# Patient Record
Sex: FEMALE
[De-identification: names, ages, dates, MRNs, and addresses within clinical notes are randomized per-mention and may not be internally consistent; named-entity substitution may affect disease eponyms.]

---

## 2019-09-27 ENCOUNTER — TRANSCRIPTION ENCOUNTER (OUTPATIENT)
Age: 34
End: 2019-09-27

## 2020-01-18 ENCOUNTER — TRANSCRIPTION ENCOUNTER (OUTPATIENT)
Age: 35
End: 2020-01-18

## 2020-08-23 ENCOUNTER — TRANSCRIPTION ENCOUNTER (OUTPATIENT)
Age: 35
End: 2020-08-23

## 2021-02-09 ENCOUNTER — TRANSCRIPTION ENCOUNTER (OUTPATIENT)
Age: 36
End: 2021-02-09

## 2021-07-23 ENCOUNTER — TRANSCRIPTION ENCOUNTER (OUTPATIENT)
Age: 36
End: 2021-07-23

## 2022-12-16 PROBLEM — Z00.00 ENCOUNTER FOR PREVENTIVE HEALTH EXAMINATION: Status: ACTIVE | Noted: 2022-12-16

## 2022-12-19 ENCOUNTER — APPOINTMENT (OUTPATIENT)
Dept: ORTHOPEDIC SURGERY | Facility: CLINIC | Age: 37
End: 2022-12-19

## 2022-12-19 ENCOUNTER — NON-APPOINTMENT (OUTPATIENT)
Age: 37
End: 2022-12-19

## 2022-12-19 VITALS — WEIGHT: 293 LBS | BODY MASS INDEX: 50.02 KG/M2 | HEIGHT: 64 IN

## 2022-12-19 PROCEDURE — 99204 OFFICE O/P NEW MOD 45 MIN: CPT

## 2022-12-19 RX ORDER — MONTELUKAST SODIUM 10 MG/1
TABLET, FILM COATED ORAL
Refills: 0 | Status: ACTIVE | COMMUNITY

## 2022-12-19 RX ORDER — METFORMIN HYDROCHLORIDE 625 MG/1
TABLET ORAL
Refills: 0 | Status: ACTIVE | COMMUNITY

## 2022-12-19 NOTE — REVIEW OF SYSTEMS
[Joint Pain] : joint pain [Negative] : Heme/Lymph no back pain, no gout, no musculoskeletal pain, no neck pain, and no weakness.

## 2022-12-28 ENCOUNTER — APPOINTMENT (OUTPATIENT)
Dept: ORTHOPEDIC SURGERY | Facility: CLINIC | Age: 37
End: 2022-12-28

## 2022-12-28 DIAGNOSIS — M54.16 RADICULOPATHY, LUMBAR REGION: ICD-10-CM

## 2022-12-28 PROCEDURE — 99214 OFFICE O/P EST MOD 30 MIN: CPT

## 2022-12-28 RX ORDER — PREDNISONE 10 MG/1
10 TABLET ORAL
Qty: 30 | Refills: 0 | Status: ACTIVE | COMMUNITY
Start: 2022-12-19 | End: 1900-01-01

## 2022-12-29 NOTE — PHYSICAL EXAM
[de-identified] : Constitutional: Well-nourished, well-developed, No acute distress, morbidly obese\par Respiratory:  Good respiratory effort, no SOB\par Lymphatic: No regional lymphadenopathy, no lymphedema\par Psychiatric: Pleasant and normal affect, alert and oriented x3\par Musculoskeletal: normal except where as noted in regional exam\par \par \par Lumbar Spine Exam\par APPEARANCE: no marked deformities or malalignment, normal curvature of the lumbosacral spine. good posture.\par POSITIVE TENDERNESS: + Left lower lumbar paraspinal muscles\par NONTENDER: no bony midline tenderness\par ROM: Limited flexion due to stiffness and pain, mildly limited SB/Rot\par RESISTIVE TESTING: + pain 4/5 resisted flex/ext, sidebending b/l, and rotation\par SPECIAL TESTS: + Left SLR and FRANCISCA, neg Trendelenburg b/l \par PULSES: 2+ DP/PT pulses\par NEURO:  + weakness of left dorsiflexion and great toe extension, otherwise L1 - S2 intact to sensation and motor, DTRs 2+/4 patella and achilles\par  [de-identified] : I reviewed, interpreted and clinically correlated the following outside imaging studies,\par  Date of Exam: 12-\par  \par EXAM:  MRI LUMBAR SPINE WITHOUT CONTRAST\par \par HISTORY: Lower back pain.  \par \par TECHNIQUE:  MRI of the lumbar spine was performed utilizing axial T1 and T2, as well as sagittal T1 and T2 weighted pulse sequences, without intravenous contrast material.\par \par COMPARISON:  None.\par \par FINDINGS:\par \par For purposes of this dictation, the last well-formed disc space will be labeled L5-S1.\par \par Osseous structures: Five lumbar bodies are assumed. No acute fracture. No pars defect identified within the limitations of MRI technique. Vertebral body heights are preserved. No areas of bone destruction or abnormal marrow replacement.\par Alignment: Straightening of the normal lumbar lordosis. No listhesis. No scoliosis.\par Spinal cord: Normal.\par Included thoracic spine and sacrum: Unremarkable.\par Paraspinal musculature: Unremarkable.\par Included abdomen and pelvis: Unremarkable.\par Individual motion segments: Disc desiccation from L3 to L5.\par \par The following axial levels are imaged and detailed below:\par \par L1-L2: No disc bulging or herniation. No spinal canal or foraminal stenosis.\par \par L2-L3: No disc bulging or herniation. No spinal canal or foraminal stenosis.\par \par L3-L4: Broad-based disc bulge with a superimposed left paracentral disc protrusion. There is mild bilateral facet arthrosis. This results in mild left neural foraminal narrowing.\par \par L4-L5: Broad-based disc bulge with a superimposed inferiorly migrated left paracentral disc extrusion which narrows the left lateral recess. This combines with mild bilateral facet arthrosis to result in moderate right and mild left neural foraminal narrowing\par \par L5-S1: No disc bulging or herniation. Mild facet arthrosis. No spinal canal or foraminal stenosis.\par \par IMPRESSION: MRI of the lumbar spine demonstrates:\par \par Disc herniations at L3-L4 and L4-L5. There is moderate right and mild left neural foraminal narrowing. At L4-L5, the disc extrusion narrows the left lateral recess (descending L5 nerve root).

## 2022-12-29 NOTE — DISCUSSION/SUMMARY
[de-identified] : Patient was seen today for reevaluation of chronic low back pain with left lower extremity radiculopathy.  There has been no significant interval change in the patient's clinical condition based on reported symptoms or her clinical exam since last evaluation 2 weeks ago.  We reviewed her MRI results which shows that she has significant disc extrusion causing notable compression of the left-sided L5 nerve root.  Patient was advised that this is the likely etiology of her pain.  With the persistence of the patient's pain and dysfunction as well as her significant MRI findings I recommend she have consultation with one of my orthopedic spine associates to discuss risk/benefits of surgical intervention.  While she might be a candidate for epidural steroid injection prior to surgical intervention I do recommend surgical consultation as next step in management as she has a significant disc extrusion which may be too far progressed to undergo injection.  Patient states she has been in significant pain and has had limited response to several oral medications, I am unsure if epidural injection and/or physical therapy will be able to help the patient, I recommend at least a surgical consultation as next step prior to further conservative measures that may be futile.\par Patient is requesting medication for pain today.  I inquired if the patient had any relief from the prednisone prescribed at last visit, she states that the pharmacy never received the prescription.  I inquired if the patient called me to notify me of this since this was 2 weeks ago, she states she called the .  I asked the patient what phone number she called, she did not know what number she called.  I advised the patient we do not have general access to the  at this office location, we only have a phone number that goes directly to a call center that services all of our multiple office locations.  They are always dutiful in taking notes and providing messages to my staff and myself when left for me.  I advised the patient if she called the office and notified us that she did not receive the medication we could have addressed this immediately, there was no need to wait for 2-week follow-up to notify me that she has not received the medication I prescribed at last visit.  I advised the patient that we can represcribe the prednisone as discussed, she states that prednisone does not work for her and that she has been on prednisone for the last 2 months.  I inquired where the patient has received her prednisone prescriptions, she states the ER and urgent care.  I reiterated to the patient the same discussion we had at our last office visit 2 weeks ago that she received a Medrol Dosepak and that is not a sufficient strength or duration to address lumbar radicular pain, the best next step in medical management is still to proceed with an appropriate prednisone taper as discussed at last visit.  I also reminded the patient that a Medrol Dosepak is a 6-day course of prednisone, that does not mean she was on prednisone for the last 2 months.  At this time recommend a course of oral prednisone with an appropriate taper (We discussed all possible side effects of this medication), patient advised to not take oral NSAIDs while on prednisone.  Patient does not think prednisone will work, she is requesting a prescription for oxycodone.  I advised the patient that she is now 8 weeks status post onset of injury, I do not recommend starting narcotics at this time as they have addictive potential and are not safe for her.  I reviewed with the patient again that she had pain for 6 weeks before she presented to the office for evaluation, and that she was seen 2 weeks ago.  If she was in significant pain I would have her expected her to call my office and be in more close contact and not wait for 2-week follow-up to notify me that she is in" severe pain" which would require narcotic pain medication.  Additionally, patient had MRI completed 1 week ago, there was no need for her to wait an entire week to come back in and review the results, she could have called me immediately when the MRI was completed, and/or she could have been seen sooner as I had multiple appointments available every work day for the last 1 week.  While the patient states she is in severe pain, she is seated comfortably in the chair in no acute distress.  With her above listed delays in seeking treatment I have significant questions as to the accuracy of her reported severe pain level.\par Patient will proceed with orthopedic spine consultation as recommended, the surgeon can help her determine what is the best next course in her management in regards to surgical intervention versus further management of her lumbar radicular pain by physiatry.  Patient was accompanied to the office today by a young female adult, and she called her mother who participated in the office visit via [employer].  Patient understands and agrees with current plan.\par \par This note was generated using dragon medical dictation software.  A reasonable effort has been made for proofreading its contents, but typos may still remain.  If there are any questions or points of clarification needed please notify my office.
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
## 2022-12-29 NOTE — RETURN TO WORK/SCHOOL
[FreeTextEntry1] : Elisha was seen today for evaluation of chronic low back pain.  She is unable to perform her work duties at this time.  Please excuse her from work until reassessed in 2 weeks.  This note expires on 1/3/2023.  If no new note is provided please consider cleared for full work duty without restrictions as of 1/4/2023.\par Should you have any questions please call the office at [phone]\par Thank you for your understanding.\par \par Sincerely,\par \par John Peoples DO, ATC\par Primary Care Sports Medicine\par [facility]\par

## 2022-12-29 NOTE — HISTORY OF PRESENT ILLNESS
[de-identified] : Patient is here for LBP follow up. Patient is here to review MRI results. Patient is taking Ibuprofen. She states that she has noticed swelling in her left foot. She has not started PT. She is not working currently.

## 2022-12-29 NOTE — PHYSICAL EXAM
[de-identified] : Constitutional: Well-nourished, well-developed, No acute distress, morbidly obese\par Respiratory:  Good respiratory effort, no SOB\par Lymphatic: No regional lymphadenopathy, no lymphedema\par Psychiatric: Pleasant and normal affect, alert and oriented x3\par Musculoskeletal: normal except where as noted in regional exam\par \par \par Lumbar Spine Exam\par APPEARANCE: no marked deformities or malalignment, normal curvature of the lumbosacral spine. good posture.\par POSITIVE TENDERNESS: + Left lower lumbar paraspinal muscles\par NONTENDER: no bony midline tenderness\par ROM: Limited flexion due to stiffness and pain, mildly limited SB/Rot\par RESISTIVE TESTING: + pain 4/5 resisted flex/ext, sidebending b/l, and rotation\par SPECIAL TESTS: + Left SLR and FRANCISCA, neg Trendelenburg b/l \par PULSES: 2+ DP/PT pulses\par NEURO:  + weakness of left dorsiflexion and great toe extension, otherwise L1 - S2 intact to sensation and motor, DTRs 2+/4 patella and achilles\par  [de-identified] : I reviewed, interpreted and clinically correlated the following outside imaging studies,\par \par [facility] 11/30/22\par X-ray report, multiple views of the lumbar spine\par No evidence of acute compression fracture or spondylolisthesis. \par \par Patient does not bring CD of images to review actual x-rays.

## 2023-01-11 ENCOUNTER — APPOINTMENT (OUTPATIENT)
Dept: ORTHOPEDIC SURGERY | Facility: CLINIC | Age: 38
End: 2023-01-11
Payer: MEDICAID

## 2023-01-11 VITALS — HEIGHT: 64 IN | WEIGHT: 293 LBS | BODY MASS INDEX: 50.02 KG/M2

## 2023-01-11 DIAGNOSIS — M54.16 RADICULOPATHY, LUMBAR REGION: ICD-10-CM

## 2023-01-11 DIAGNOSIS — M51.26 OTHER INTERVERTEBRAL DISC DISPLACEMENT, LUMBAR REGION: ICD-10-CM

## 2023-01-11 PROCEDURE — 99214 OFFICE O/P EST MOD 30 MIN: CPT

## 2023-01-11 RX ORDER — DICLOFENAC SODIUM 75 MG/1
75 TABLET, DELAYED RELEASE ORAL
Qty: 60 | Refills: 1 | Status: ACTIVE | COMMUNITY
Start: 2023-01-11 | End: 1900-01-01
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
## 2023-01-11 NOTE — PHYSICAL EXAM
[Antalgic] : antalgic [SLR] : positive straight leg raise [Jones's Sign] : negative Jones's sign [Pronator Drift] : negative pronator drift [de-identified] : 5 out of 5 motor strength,sensation is intact and symmetrical full range of motion flexion extension and rotation, no palpatory tenderness full range of motion of hips knees shoulders and elbows (all four extremities), no atrophy, negative straight leg raise, no pathological reflexes, no swelling, normal ambulation, no apparent distress skin intact, no palpable lymph nodes, no upper or lower extremity instability, alert and oriented x 3 and normal mood. Normal finger-to nose test.\par +SLR left.\par  [de-identified] :  Date of Exam: 12-\par  \par EXAM: MRI LUMBAR SPINE WITHOUT CONTRAST    ([facility]) \par \par HISTORY: Lower back pain. \par \par TECHNIQUE: MRI of the lumbar spine was performed utilizing axial T1 and T2, as well as sagittal T1 and T2 weighted pulse sequences, without intravenous contrast material.\par \par COMPARISON: None.\par \par FINDINGS:\par \par For purposes of this dictation, the last well-formed disc space will be labeled L5-S1.\par \par Osseous structures: Five lumbar bodies are assumed. No acute fracture. No pars defect identified within the limitations of MRI technique. Vertebral body heights are preserved. No areas of bone destruction or abnormal marrow replacement.\par Alignment: Straightening of the normal lumbar lordosis. No listhesis. No scoliosis.\par Spinal cord: Normal.\par Included thoracic spine and sacrum: Unremarkable.\par Paraspinal musculature: Unremarkable.\par Included abdomen and pelvis: Unremarkable.\par Individual motion segments: Disc desiccation from L3 to L5.\par \par The following axial levels are imaged and detailed below:\par \par L1-L2: No disc bulging or herniation. No spinal canal or foraminal stenosis.\par \par L2-L3: No disc bulging or herniation. No spinal canal or foraminal stenosis.\par \par L3-L4: Broad-based disc bulge with a superimposed left paracentral disc protrusion. There is mild bilateral facet arthrosis. This results in mild left neural foraminal narrowing.\par \par L4-L5: Broad-based disc bulge with a superimposed inferiorly migrated left paracentral disc extrusion which narrows the left lateral recess. This combines with mild bilateral facet arthrosis to result in moderate right and mild left neural foraminal narrowing\par \par L5-S1: No disc bulging or herniation. Mild facet arthrosis. No spinal canal or foraminal stenosis.\par \par IMPRESSION: MRI of the lumbar spine demonstrates:\par \par Disc herniations at L3-L4 and L4-L5. There is moderate right and mild left neural foraminal narrowing. At L4-L5, the disc extrusion narrows the left lateral recess (descending L5 nerve root). \par \par

## 2023-01-11 NOTE — DISCUSSION/SUMMARY
[de-identified] : Left L4-5 radiculopathy.\par Discussed all options. \par Diclofenac PRN.\par Pain management-left L5 SNRB.\par F/U after pain management consultation.\par All options discussed including rest,medicine,home exercise, acupuncture, Chiropractic care, physical therapy, pain management and last resort surgery. All questions were answered, all alternatives discussed and the patient is in complete agreement with the plan. Follow up appointment as instructed. If any issues arise, the patient will call or come in sooner.\par

## 2023-01-13 ENCOUNTER — TRANSCRIPTION ENCOUNTER (OUTPATIENT)
Age: 38
End: 2023-01-13

## 2023-10-01 PROBLEM — M54.16 LUMBAR RADICULAR PAIN: Status: ACTIVE | Noted: 2022-12-19

## 2025-03-11 NOTE — REASON FOR VISIT
Medication passed protocol.    Disp Refills Start End    fluticasone (FLONASE) 50 MCG/ACT nasal spray 48 g 3 3/20/2024 --    Sig - Route: Spray 2 sprays in each nostril daily.      Last office visit date: 3/20/24    Next appointment scheduled?: Yes   Number of refills given: 48 w/ 0 refills      Medication:    Disp Refills Start End    zafirlukast (ACCOLATE) 20 MG tablet 180 tablet 3 3/20/2024 --    Sig: Take 1 tablet by mouth every morning and 1 tablet before bedtime      Last office visit date: 3/20/24    Medication Refill Protocol Failed.  Protocol approved due to: identified sig mis match, same prescription.      [Initial Visit] : an initial visit for [Back Pain] : back pain